# Patient Record
Sex: FEMALE | Race: BLACK OR AFRICAN AMERICAN | Employment: FULL TIME | URBAN - METROPOLITAN AREA
[De-identification: names, ages, dates, MRNs, and addresses within clinical notes are randomized per-mention and may not be internally consistent; named-entity substitution may affect disease eponyms.]

---

## 2018-06-12 LAB — RUBELLA ANTIBODY IGG: 251.1 IU/ML

## 2018-06-13 LAB
MUV IGG SER QL: 37.8 AU/ML
RUBEOLA (MEASLES) AB IGG: 169 AU/ML
VZV IGG SER QL IA: 138 IV

## 2019-01-14 ENCOUNTER — OFFICE VISIT (OUTPATIENT)
Dept: SURGERY | Age: 34
End: 2019-01-14
Payer: MEDICAID

## 2019-01-14 VITALS
HEIGHT: 66 IN | OXYGEN SATURATION: 97 % | TEMPERATURE: 97.9 F | DIASTOLIC BLOOD PRESSURE: 74 MMHG | RESPIRATION RATE: 12 BRPM | HEART RATE: 70 BPM | BODY MASS INDEX: 29.89 KG/M2 | WEIGHT: 186 LBS | SYSTOLIC BLOOD PRESSURE: 134 MMHG

## 2019-01-14 DIAGNOSIS — N64.81 BREAST PTOSIS: ICD-10-CM

## 2019-01-14 DIAGNOSIS — Z41.1 ENCOUNTER FOR COSMETIC SURGERY: ICD-10-CM

## 2019-01-14 DIAGNOSIS — M79.3 PANNICULITIS: Primary | ICD-10-CM

## 2019-01-14 PROCEDURE — G8417 CALC BMI ABV UP PARAM F/U: HCPCS | Performed by: SURGERY

## 2019-01-14 PROCEDURE — 99204 OFFICE O/P NEW MOD 45 MIN: CPT | Performed by: SURGERY

## 2019-01-14 PROCEDURE — G8427 DOCREV CUR MEDS BY ELIG CLIN: HCPCS | Performed by: SURGERY

## 2019-01-14 PROCEDURE — G8484 FLU IMMUNIZE NO ADMIN: HCPCS | Performed by: SURGERY

## 2019-01-14 PROCEDURE — 1036F TOBACCO NON-USER: CPT | Performed by: SURGERY

## 2019-01-14 RX ORDER — PSEUDOEPHEDRINE HCL 30 MG
500 TABLET ORAL
COMMUNITY
Start: 2018-10-30

## 2019-03-19 ENCOUNTER — TELEPHONE (OUTPATIENT)
Dept: SURGERY | Age: 34
End: 2019-03-19

## 2019-11-07 ENCOUNTER — APPOINTMENT (OUTPATIENT)
Dept: GENERAL RADIOLOGY | Age: 34
End: 2019-11-07
Payer: MEDICAID

## 2019-11-07 ENCOUNTER — HOSPITAL ENCOUNTER (EMERGENCY)
Age: 34
Discharge: HOME | End: 2019-11-07
Payer: MEDICAID

## 2019-11-07 VITALS
BODY MASS INDEX: 31.19 KG/M2 | WEIGHT: 190.31 LBS | HEART RATE: 86 BPM | RESPIRATION RATE: 16 BRPM | TEMPERATURE: 97.9 F | OXYGEN SATURATION: 100 % | DIASTOLIC BLOOD PRESSURE: 84 MMHG | SYSTOLIC BLOOD PRESSURE: 133 MMHG

## 2019-11-07 DIAGNOSIS — S16.1XXA CERVICAL STRAIN, ACUTE, INITIAL ENCOUNTER: Primary | ICD-10-CM

## 2019-11-07 DIAGNOSIS — M54.6 ACUTE MIDLINE THORACIC BACK PAIN: ICD-10-CM

## 2019-11-07 PROCEDURE — 72040 X-RAY EXAM NECK SPINE 2-3 VW: CPT

## 2019-11-07 PROCEDURE — 72070 X-RAY EXAM THORAC SPINE 2VWS: CPT

## 2019-11-07 PROCEDURE — 99283 EMERGENCY DEPT VISIT LOW MDM: CPT

## 2019-11-07 PROCEDURE — 6370000000 HC RX 637 (ALT 250 FOR IP): Performed by: PHYSICIAN ASSISTANT

## 2019-11-07 RX ORDER — LIDOCAINE 50 MG/G
1 PATCH TOPICAL DAILY
Qty: 30 PATCH | Refills: 0 | Status: SHIPPED | OUTPATIENT
Start: 2019-11-07

## 2019-11-07 RX ORDER — METHOCARBAMOL 750 MG/1
750 TABLET, FILM COATED ORAL EVERY 8 HOURS PRN
Qty: 30 TABLET | Refills: 0 | Status: SHIPPED | OUTPATIENT
Start: 2019-11-07 | End: 2019-11-17

## 2019-11-07 RX ORDER — FERROUS SULFATE 325(65) MG
325 TABLET ORAL
COMMUNITY
End: 2021-11-09 | Stop reason: SDUPTHER

## 2019-11-07 RX ORDER — LIDOCAINE 4 G/G
1 PATCH TOPICAL DAILY
Status: DISCONTINUED | OUTPATIENT
Start: 2019-11-07 | End: 2019-11-07 | Stop reason: HOSPADM

## 2019-11-07 RX ORDER — THIAMINE MONONITRATE (VIT B1) 100 MG
100 TABLET ORAL DAILY
COMMUNITY

## 2019-11-07 RX ORDER — IBUPROFEN 200 MG
400 TABLET ORAL ONCE
Status: COMPLETED | OUTPATIENT
Start: 2019-11-07 | End: 2019-11-07

## 2019-11-07 RX ORDER — NAPROXEN 500 MG/1
500 TABLET ORAL 2 TIMES DAILY PRN
Qty: 20 TABLET | Refills: 0 | Status: SHIPPED | OUTPATIENT
Start: 2019-11-07 | End: 2021-11-09

## 2019-11-07 RX ADMIN — IBUPROFEN 400 MG: 200 TABLET, FILM COATED ORAL at 13:54

## 2019-11-07 ASSESSMENT — ENCOUNTER SYMPTOMS
DIARRHEA: 0
SHORTNESS OF BREATH: 0
COLOR CHANGE: 0
ABDOMINAL PAIN: 0
NAUSEA: 0
VOMITING: 0
CHEST TIGHTNESS: 0

## 2019-11-07 ASSESSMENT — PAIN DESCRIPTION - PAIN TYPE: TYPE: ACUTE PAIN

## 2019-11-07 ASSESSMENT — PAIN DESCRIPTION - LOCATION: LOCATION: BACK

## 2019-11-07 ASSESSMENT — PAIN SCALES - GENERAL: PAINLEVEL_OUTOF10: 10

## 2020-07-19 ENCOUNTER — HOSPITAL ENCOUNTER (EMERGENCY)
Age: 35
Discharge: HOME | End: 2020-07-19
Payer: MEDICAID

## 2020-07-19 VITALS
TEMPERATURE: 97.5 F | HEART RATE: 76 BPM | BODY MASS INDEX: 33.68 KG/M2 | WEIGHT: 209.56 LBS | HEIGHT: 66 IN | DIASTOLIC BLOOD PRESSURE: 85 MMHG | SYSTOLIC BLOOD PRESSURE: 132 MMHG | RESPIRATION RATE: 16 BRPM

## 2020-07-19 PROCEDURE — 6370000000 HC RX 637 (ALT 250 FOR IP): Performed by: PHYSICIAN ASSISTANT

## 2020-07-19 PROCEDURE — 99282 EMERGENCY DEPT VISIT SF MDM: CPT

## 2020-07-19 RX ORDER — PREDNISONE 20 MG/1
TABLET ORAL
Qty: 18 TABLET | Refills: 0 | Status: SHIPPED | OUTPATIENT
Start: 2020-07-19 | End: 2020-07-29

## 2020-07-19 RX ORDER — FAMOTIDINE 20 MG/1
20 TABLET, FILM COATED ORAL 2 TIMES DAILY
Qty: 60 TABLET | Refills: 0 | Status: SHIPPED | OUTPATIENT
Start: 2020-07-19

## 2020-07-19 RX ORDER — PREDNISONE 20 MG/1
60 TABLET ORAL ONCE
Status: COMPLETED | OUTPATIENT
Start: 2020-07-19 | End: 2020-07-19

## 2020-07-19 RX ORDER — DIPHENHYDRAMINE HCL 25 MG
25 CAPSULE ORAL EVERY 4 HOURS PRN
Qty: 25 CAPSULE | Refills: 0 | Status: SHIPPED | OUTPATIENT
Start: 2020-07-19 | End: 2020-07-29

## 2020-07-19 RX ORDER — DIPHENHYDRAMINE HCL 25 MG
25 TABLET ORAL ONCE
Status: COMPLETED | OUTPATIENT
Start: 2020-07-19 | End: 2020-07-19

## 2020-07-19 RX ORDER — FAMOTIDINE 20 MG/1
20 TABLET, FILM COATED ORAL ONCE
Status: COMPLETED | OUTPATIENT
Start: 2020-07-19 | End: 2020-07-19

## 2020-07-19 RX ADMIN — PREDNISONE 60 MG: 20 TABLET ORAL at 09:45

## 2020-07-19 RX ADMIN — DIPHENHYDRAMINE HCL 25 MG: 25 TABLET ORAL at 09:45

## 2020-07-19 RX ADMIN — FAMOTIDINE 20 MG: 20 TABLET, FILM COATED ORAL at 09:44

## 2020-07-19 ASSESSMENT — ENCOUNTER SYMPTOMS
RESPIRATORY NEGATIVE: 1
DIARRHEA: 0
COUGH: 0
CONSTIPATION: 0
CHEST TIGHTNESS: 0
BACK PAIN: 0
COLOR CHANGE: 0
SHORTNESS OF BREATH: 0
NAUSEA: 0
VOMITING: 0
ABDOMINAL PAIN: 0

## 2020-07-19 NOTE — ED PROVIDER NOTES
[facility]        Pt Name: Cindy Carrizales  MRN: [id number]  [place] 1985  Date of evaluation: 7/19/2020  Provider: WAN Harvey  PCP: Grace Gonzalez    Evaluation by LUIS ALFREDO. My supervising physician was available for consultation. CHIEF COMPLAINT       Chief Complaint   Patient presents with    Pruritis     Pt. comes in today with complaints of itching. Pt. reports that it started around 6am this morning. Pt. reports that she is also breaking out in hives. Pt. denies any new exposures. HISTORY OF PRESENT ILLNESS   (Location, Timing/Onset, Context/Setting, Quality, Duration, Modifying Factors, Severity, Associated Signs and Symptoms)  Note limiting factors. Cindy Carrizales is a 30 y.o. female with no significant past medical history who presents to the ED with complaint of itching. Came in for shift today and states she started having diffuse itching. States she started breaking out in hives. Patient states hives diffusely throughout her skin. States hives the arms, legs, face, neck, abdomen and back. Patient denies any new contacts including soaps, detergents, medicines, foods, cosmetics, lotions or clothing. Denies any itching and family members at home. Denies history of similar symptoms in the past.  Denies taking any over-the-counter medication for symptom control. Became concerned and came to the ED for further evaluation treatment. Nursing Notes were all reviewed and agreed with or any disagreements were addressed in the HPI. REVIEW OF SYSTEMS    (2-9 systems for level 4, 10 or more for level 5)     Review of Systems   Constitutional: Negative for activity change, appetite change, chills, diaphoresis, fatigue and fever. Respiratory: Negative. Negative for cough, chest tightness and shortness of breath. Cardiovascular: Negative. Negative for chest pain, palpitations and leg swelling. Gastrointestinal: Negative for abdominal pain, constipation, diarrhea, nausea and vomiting. Genitourinary: Negative for difficulty urinating and dysuria. Musculoskeletal: Negative for arthralgias, back pain, myalgias, neck pain and neck stiffness. Skin: Positive for rash. Negative for color change, pallor and wound. Neurological: Negative for dizziness, weakness, light-headedness, numbness and headaches. Positives and Pertinent negatives as per HPI. Except as noted above in the ROS, all other systems were reviewed and negative. PAST MEDICAL HISTORY     Past Medical History:   Diagnosis Date    Arthritis          SURGICAL HISTORY     Past Surgical History:   Procedure Laterality Date    BARIATRIC SURGERY       SECTION           CURRENTMEDICATIONS       Previous Medications    CALCIUM CITRATE (CALCITRATE) 250 MG TABS TABLET    Take 500 mg by mouth    CALCIUM CITRATE-VITAMIN D (CITRACAL PETITES/VITAMIN D) 200-250 MG-UNIT TABS    Take 1 tablet by mouth    ELASTIC BANDAGES & SUPPORTS (ABDOMINAL BINDER/ELASTIC XL) MISC    Dispense one extra large binder . FERROUS SULFATE 325 (65 FE) MG TABLET    Take 325 mg by mouth daily (with breakfast)    LIDOCAINE (LIDODERM) 5 %    Place 1 patch onto the skin daily 12 hours on, 12 hours off. NAPROXEN (NAPROSYN) 500 MG TABLET    Take 1 tablet by mouth 2 times daily as needed for Pain    ONDANSETRON (ZOFRAN ODT) 4 MG DISINTEGRATING TABLET    Take 1-2 tablets by mouth every 12 hours as needed for Nausea. VITAMIN B-1 (THIAMINE) 100 MG TABLET    Take 100 mg by mouth daily         ALLERGIES     Patient has no known allergies. FAMILYHISTORY     History reviewed. No pertinent family history.        SOCIAL HISTORY       Social History     Tobacco Use    Smoking status: Never Smoker    Smokeless tobacco: Never Used   Substance Use Topics    Alcohol use: No    Drug use: Not Currently       SCREENINGS             PHYSICAL EXAM    (up to 7 for level 4, 8 or more for level 5)     ED Triage Vitals [07/19/20 0930]   BP Temp Temp Source Pulse Resp SpO2 Height Weight   132/85 97.5 °F (36.4 °C) Oral 76 16 -- 5' 5.5\" (1.664 m) 209 lb 9 oz (95.1 kg)       Physical Exam  Constitutional:       General: She is not in acute distress. Appearance: Normal appearance. She is well-developed. She is not ill-appearing, toxic-appearing or diaphoretic. HENT:      Head: Normocephalic and atraumatic. Right Ear: External ear normal.      Left Ear: External ear normal.      Mouth/Throat:      Mouth: Mucous membranes are moist.      Pharynx: No oropharyngeal exudate or posterior oropharyngeal erythema. Comments: No drooling, trismus, stridor or respiratory stress noted. No change in phonation. Eyes:      General:         Right eye: No discharge. Left eye: No discharge. Conjunctiva/sclera: Conjunctivae normal.   Neck:      Musculoskeletal: Normal range of motion and neck supple. Cardiovascular:      Rate and Rhythm: Normal rate and regular rhythm. Pulses: Normal pulses. Heart sounds: Normal heart sounds. No murmur. No friction rub. No gallop. Pulmonary:      Effort: Pulmonary effort is normal. No respiratory distress. Breath sounds: Normal breath sounds. No stridor. No wheezing, rhonchi or rales. Chest:      Chest wall: No tenderness. Musculoskeletal: Normal range of motion. Skin:     General: Skin is warm and dry. Coloration: Skin is not pale. Findings: Rash present. No erythema. Comments: Urticarial rash noted to the arms, legs, abdomen, back, neck and face. No oral mucosal involvement noted. Neurological:      Mental Status: She is alert and oriented to person, place, and time. Psychiatric:         Behavior: Behavior normal.         DIAGNOSTIC RESULTS   LABS:    Labs Reviewed - No data to display    All other labs were within normal range or not returned as of this dictation. EKG:  All EKG's are interpreted by the Emergency Department Physician in the absence of a cardiologist.  Please see their note for interpretation of EKG. RADIOLOGY:   Non-plain film images such as CT, Ultrasound and MRI are read by the radiologist. Plain radiographic images are visualized and preliminarily interpreted by the ED Provider with the below findings:        Interpretation per the Radiologist below, if available at the time of this note:    No orders to display     No results found. PROCEDURES   Unless otherwise noted below, none     Procedures    CRITICAL CARE TIME   N/A    CONSULTS:  None      EMERGENCY DEPARTMENT COURSE and DIFFERENTIAL DIAGNOSIS/MDM:   Vitals:    Vitals:    07/19/20 0930   BP: 132/85   Pulse: 76   Resp: 16   Temp: 97.5 °F (36.4 °C)   TempSrc: Oral   Weight: 209 lb 9 oz (95.1 kg)   Height: 5' 5.5\" (1.664 m)       Patient was given the following medications:  Medications   predniSONE (DELTASONE) tablet 60 mg (60 mg Oral Given 7/19/20 0945)   diphenhydrAMINE (BENADRYL) tablet 25 mg (25 mg Oral Given 7/19/20 0945)   famotidine (PEPCID) tablet 20 mg (20 mg Oral Given 7/19/20 0944)           Patient is a 60-year-old female who presents the input of a rash. Has hives with associated itching concerning for urticarial rash. No new contacts. No oral mucosal involvement. No respiratory distress. Given prednisone, Benadryl and Pepcid here in the ED. Upon repeat examination patient still has hives with appears to be improving. Will discharge home with similar. Follow-up with PCP. Return if any worsening symptoms. Suffering from what appears to be urticarial rash. Low suspicion for angioedema, anaphylaxis, SJS, HSP, TEN, scabies, bedbugs, varicella, milia, tinea, erysipelas, scalded skin syndrome, meningococcemia, occult bacteremia, necrotizing fasciitis, folliculitis, cellulitis, abscess or other emergent etiology at this time. FINAL IMPRESSION      1.  Urticarial rash          DISPOSITION/PLAN DISPOSITION        PATIENT REFERREDTO:  Blake Part  [address] [facility] [address] [phone]    Schedule an appointment as soon as possible for a visit   As needed, If symptoms worsen    [facility] Emergency Department  [address] [facility]  [phone]  Go to   If symptoms worsen, As needed      DISCHARGE MEDICATIONS:  New Prescriptions    DIPHENHYDRAMINE (BENADRYL) 25 MG CAPSULE    Take 1 capsule by mouth every 4 hours as needed for Itching    FAMOTIDINE (PEPCID) 20 MG TABLET    Take 1 tablet by mouth 2 times daily    PREDNISONE (DELTASONE) 20 MG TABLET    3 tabs po qam for 3 days then 2 tabs qam for 3 days the 1 tab qam for 3 days       DISCONTINUED MEDICATIONS:  Discontinued Medications    No medications on file              (Please note that portions of this note were completed with a voice recognition program.  Efforts were made to edit the dictations but occasionally words are mis-transcribed.)    WAN Lopez (electronically signed)          WAN Carlson  07/19/20 7331

## 2021-06-16 ENCOUNTER — IMMUNIZATION (OUTPATIENT)
Dept: PRIMARY CARE CLINIC | Age: 36
End: 2021-06-16
Payer: MEDICAID

## 2021-06-16 PROCEDURE — 0001A COVID-19, PFIZER VACCINE 30MCG/0.3ML DOSE: CPT | Performed by: FAMILY MEDICINE

## 2021-06-16 PROCEDURE — 91300 COVID-19, PFIZER VACCINE 30MCG/0.3ML DOSE: CPT | Performed by: FAMILY MEDICINE

## 2021-07-14 ENCOUNTER — IMMUNIZATION (OUTPATIENT)
Dept: PRIMARY CARE CLINIC | Age: 36
End: 2021-07-14
Payer: MEDICAID

## 2021-07-14 PROCEDURE — 0002A COVID-19, PFIZER VACCINE 30MCG/0.3ML DOSE: CPT | Performed by: FAMILY MEDICINE

## 2021-07-14 PROCEDURE — 91300 COVID-19, PFIZER VACCINE 30MCG/0.3ML DOSE: CPT | Performed by: FAMILY MEDICINE

## 2021-08-04 ENCOUNTER — OFFICE VISIT (OUTPATIENT)
Dept: SURGERY | Age: 36
End: 2021-08-04
Payer: MEDICAID

## 2021-08-04 VITALS
HEART RATE: 98 BPM | SYSTOLIC BLOOD PRESSURE: 112 MMHG | WEIGHT: 227 LBS | BODY MASS INDEX: 37.82 KG/M2 | DIASTOLIC BLOOD PRESSURE: 66 MMHG | HEIGHT: 65 IN

## 2021-08-04 DIAGNOSIS — M62.08 RECTUS DIASTASIS: ICD-10-CM

## 2021-08-04 DIAGNOSIS — N64.81 BREAST PTOSIS: ICD-10-CM

## 2021-08-04 DIAGNOSIS — M79.3 PANNICULITIS: Primary | ICD-10-CM

## 2021-08-04 PROCEDURE — G8417 CALC BMI ABV UP PARAM F/U: HCPCS | Performed by: SURGERY

## 2021-08-04 PROCEDURE — G8427 DOCREV CUR MEDS BY ELIG CLIN: HCPCS | Performed by: SURGERY

## 2021-08-04 PROCEDURE — 1036F TOBACCO NON-USER: CPT | Performed by: SURGERY

## 2021-08-04 PROCEDURE — 99213 OFFICE O/P EST LOW 20 MIN: CPT | Performed by: SURGERY

## 2021-08-04 NOTE — PROGRESS NOTES
[address] [facility]    Consultation requested by: Self Melody Perry)    CC: Body contouring    HPI: 29 y.o. female with a PMHx as delineated below who presents in consultation for body contouring. She underwent a sleeve with Dr. Dandy Nowak at [address] [place] [address] (). She has lost 150 lbs since her surgery. She is actively working to decrease her weight another 40 lbs. She has been having difficulties with rashes beneath her lowest abdominal fold and has been on prescription powder without improvement. She additionally notes that she has decreased volume of her breasts and also has excess skin in her arms as well as decreased volume in her buttocks.       Bariatric surgery: Yes / date:  (Type: sleeve gastrectomy)    Max weight (lbs): 400  Lowest weight (lbs): 186  Current (lbs): 227 (186)  Weight change in the past 3 months: Yes (gained weight)  Max BMI: 56  Current BMI: 37.7 (30.5)  History of DVT/PE: No  Excess skin cover genitals: Yes    Last Mammogram: None    Current bra size: 32B  Desired bra size: Smaller with lift  Pregnancies/miscarriages: 4 / 0  Breast feeding: no future plans    Previous body contouring procedures: No  Smoking: No      Past Medical History:   Diagnosis Date    Arthritis      Past Surgical History:   Procedure Laterality Date    BARIATRIC SURGERY       SECTION       Social History     Socioeconomic History    Marital status: Single     Spouse name: Not on file    Number of children: Not on file    Years of education: Not on file    Highest education level: Not on file   Occupational History    Not on file   Tobacco Use    Smoking status: Never Smoker    Smokeless tobacco: Never Used   Substance and Sexual Activity    Alcohol use: No    Drug use: Not Currently    Sexual activity: Not on file   Other Topics Concern    Not on file   Social History Narrative    Not on file     Social Determinants of Health     Financial Resource Strain:    [facility] Difficulty of Paying Living Expenses:    Food Insecurity:     Worried About Running Out of Food in the Last Year:     [address] in the Last Year:    Transportation Needs:     Lack of Transportation (Medical):  Lack of Transportation (Non-Medical):    Physical Activity:     Days of Exercise per Week:     Minutes of Exercise per Session:    Stress:     Feeling of Stress :    Social Connections:     Frequency of Communication with Friends and Family:     Frequency of Social Gatherings with Friends and Family:     Attends [employer] Services:     Active Member of Clubs or Organizations:     Attends Club or Organization Meetings:     Marital Status:    Intimate Partner Violence:     Fear of Current or Ex-Partner:     Emotionally Abused:     Physically Abused:     Sexually Abused:      History reviewed. No pertinent family history. Allergy: No Known Allergies    ROS   Constitutional: Negative for chills and fever. HENT: Negative for congestion, facial swelling, and voice change. Eyes: Negative for photophobia and visual disturbance. Respiratory: Negative for apnea, cough, chest tightness and shortness of breath. Cardiovascular: Negative for chest pain and palpitations. Gastrointestinal: Negative for dysphagia and early satiety. Genitourinary: Negative for difficulty urinating, dysuria, flank pain, frequency and hematuria. Musculoskeletal: Negative for new gait problem, joint swelling and myalgias. Skin: Negative for color change, pallor and rash. Endocrine: negative for tremors, temperature intolerance or polydipsia. Allergic/Immunologic: Negative for new environmental or food allergies. Neurological: Negative for dizziness, seizures, speech difficulty, numbness. Hematological: Negative for adenopathy. Psychiatric/Behavioral: Negative for agitation and confusion.     EXAM  /66 (Site: Left Upper Arm, Position: Sitting, Cuff Size: Large Adult)   Pulse 98   Ht 5' 5\" (1.651 m)   Wt 227 lb (103 kg)   LMP 07/21/2021   BMI 37.77 kg/m²     GEN: NAD, pleasant, healthy  CVS: RRR  PULM: No respiratory distress  HEENT: PERRLA/EOMI; dentition good, hearing appears within normal limits  NECK: Supple with trachea in midline, no masses  BREAST: Left larger than Right   R  Ptosis ndgndrndanddndend:nd nd2nd Palpable masses: No     Nipple retraction: No     Palpable axillary lymphadenopathy: No     SN-N: 28.5 cm     N-IMF: 9.5 cm     Breast width: 14 cm           L  Ptosis ndgndrndanddndend:nd nd2nd Palpable masses: No     Nipple retraction: No     Palpable axillary lymphadenopathy: No     SN-N: 30 cm     N-IMF: 9.4 cm     Breast width: 14 cm     Medialized nipple position  ABD: soft/NT/ND  No palpable hernia, but exam limited secondary to habitus  Excess skin in both horizontal and vertical dimensions with skin extending beyond Mons    EXT: No lymphedema noted   Excess skin upper extremities (2B)  BACK/BUTTOCK: Excess skin above the buttock with a deflated appearance of her buttocks bilaterally   NEURO: No focal deficits, no obvious CN deficits    IMP: 29 y.o. female with panniculitis, breast ptosis after MWL  PLAN: Would benefit from panniculectomy with abdominoplasty and rectus plication with concomitant mastopexy to improve her breast shape and volume without an implant. Once she has recovered from this, we can stage her buttock auto augmentation with lower back tissue and subsequently plan for her brachioplasties. She needs to work to lose weight and once she obtains weight stability, will submit to insurance for her panniculectomy. Will see her again in 6 months. The complexity of body contouring procedures and wound healing physiology were discussed with the patient. She was counseled on ideal medical optimization (nutrition, weight stability, etc.). We also discussed the pros & cons of staging for multiple procedures including controlling tension from various sites and postoperative care managment.  The risks, benefits, alternatives, outcomes, personnel involved were discussed and all questions were answered in a satisfactory manner according to the patient. Specifically,the risks including, but not limited to: bleeding possibly requiring transfusion orreoperation, infection, seroma, nonhealing of wounds, poor cosmetic outcome, scarring, VTE (DVT/PE), and death were discussed.        Bonnie Schwarz MD  [facility]  08/04/21

## 2021-11-08 PROCEDURE — 99283 EMERGENCY DEPT VISIT LOW MDM: CPT

## 2021-11-09 ENCOUNTER — APPOINTMENT (OUTPATIENT)
Dept: GENERAL RADIOLOGY | Age: 36
End: 2021-11-09
Payer: MEDICAID

## 2021-11-09 ENCOUNTER — HOSPITAL ENCOUNTER (EMERGENCY)
Age: 36
Discharge: HOME | End: 2021-11-09
Attending: EMERGENCY MEDICINE
Payer: MEDICAID

## 2021-11-09 VITALS
DIASTOLIC BLOOD PRESSURE: 101 MMHG | HEIGHT: 65 IN | TEMPERATURE: 98 F | OXYGEN SATURATION: 100 % | HEART RATE: 71 BPM | BODY MASS INDEX: 34.99 KG/M2 | RESPIRATION RATE: 16 BRPM | SYSTOLIC BLOOD PRESSURE: 143 MMHG | WEIGHT: 210 LBS

## 2021-11-09 DIAGNOSIS — Z91.89 ACUTE NONSPECIFIC CHEST PAIN WITH LOW RISK OF CORONARY ARTERY DISEASE: Primary | ICD-10-CM

## 2021-11-09 DIAGNOSIS — D50.9 MICROCYTIC ANEMIA: ICD-10-CM

## 2021-11-09 DIAGNOSIS — R07.9 ACUTE NONSPECIFIC CHEST PAIN WITH LOW RISK OF CORONARY ARTERY DISEASE: Primary | ICD-10-CM

## 2021-11-09 LAB
ANION GAP SERPL CALCULATED.3IONS-SCNC: 8 MMOL/L (ref 3–16)
ANISOCYTOSIS: ABNORMAL
BASOPHILS ABSOLUTE: 0 K/UL (ref 0–0.2)
BASOPHILS RELATIVE PERCENT: 0.6 %
BUN BLDV-MCNC: 13 MG/DL (ref 7–20)
CALCIUM SERPL-MCNC: 9.1 MG/DL (ref 8.3–10.6)
CHLORIDE BLD-SCNC: 107 MMOL/L (ref 99–110)
CO2: 23 MMOL/L (ref 21–32)
CREAT SERPL-MCNC: 0.6 MG/DL (ref 0.6–1.1)
EKG ATRIAL RATE: 77 BPM
EKG DIAGNOSIS: NORMAL
EKG P AXIS: 67 DEGREES
EKG P-R INTERVAL: 168 MS
EKG Q-T INTERVAL: 392 MS
EKG QRS DURATION: 86 MS
EKG QTC CALCULATION (BAZETT): 443 MS
EKG R AXIS: 21 DEGREES
EKG T AXIS: 20 DEGREES
EKG VENTRICULAR RATE: 77 BPM
EOSINOPHILS ABSOLUTE: 0.1 K/UL (ref 0–0.6)
EOSINOPHILS RELATIVE PERCENT: 2.7 %
GFR AFRICAN AMERICAN: >60
GFR NON-AFRICAN AMERICAN: >60
GLUCOSE BLD-MCNC: 89 MG/DL (ref 70–99)
HCT VFR BLD CALC: 24.5 % (ref 36–48)
HEMATOLOGY PATH CONSULT: NO
HEMOGLOBIN: 7.1 G/DL (ref 12–16)
HYPERCHROMASIA: ABNORMAL
LYMPHOCYTES ABSOLUTE: 1.5 K/UL (ref 1–5.1)
LYMPHOCYTES RELATIVE PERCENT: 31.9 %
MCH RBC QN AUTO: 17.9 PG (ref 26–34)
MCHC RBC AUTO-ENTMCNC: 21.9 G/DL (ref 31–36)
MCV RBC AUTO: 61.6 FL (ref 80–100)
MICROCYTES: ABNORMAL
MONOCYTES ABSOLUTE: 0.5 K/UL (ref 0–1.3)
MONOCYTES RELATIVE PERCENT: 10.9 %
NEUTROPHILS ABSOLUTE: 2.5 K/UL (ref 1.7–7.7)
NEUTROPHILS RELATIVE PERCENT: 53.9 %
OVALOCYTES: ABNORMAL
PDW BLD-RTO: 21 % (ref 12.4–15.4)
PLATELET # BLD: 512 K/UL (ref 135–450)
PLATELET SLIDE REVIEW: ABNORMAL
PMV BLD AUTO: 8.2 FL (ref 5–10.5)
POTASSIUM REFLEX MAGNESIUM: 3.7 MMOL/L (ref 3.5–5.1)
RBC # BLD: 3.98 M/UL (ref 4–5.2)
SLIDE REVIEW: ABNORMAL
SODIUM BLD-SCNC: 138 MMOL/L (ref 136–145)
TROPONIN: <0.01 NG/ML
WBC # BLD: 4.6 K/UL (ref 4–11)

## 2021-11-09 PROCEDURE — 85025 COMPLETE CBC W/AUTO DIFF WBC: CPT

## 2021-11-09 PROCEDURE — 93010 ELECTROCARDIOGRAM REPORT: CPT | Performed by: INTERNAL MEDICINE

## 2021-11-09 PROCEDURE — 6370000000 HC RX 637 (ALT 250 FOR IP): Performed by: EMERGENCY MEDICINE

## 2021-11-09 PROCEDURE — 93005 ELECTROCARDIOGRAM TRACING: CPT | Performed by: EMERGENCY MEDICINE

## 2021-11-09 PROCEDURE — 80048 BASIC METABOLIC PNL TOTAL CA: CPT

## 2021-11-09 PROCEDURE — 84484 ASSAY OF TROPONIN QUANT: CPT

## 2021-11-09 PROCEDURE — 71046 X-RAY EXAM CHEST 2 VIEWS: CPT

## 2021-11-09 RX ORDER — NAPROXEN 500 MG/1
500 TABLET ORAL 2 TIMES DAILY WITH MEALS
Qty: 20 TABLET | Refills: 0 | Status: SHIPPED | OUTPATIENT
Start: 2021-11-09 | End: 2021-11-19

## 2021-11-09 RX ORDER — METHOCARBAMOL 750 MG/1
750-1500 TABLET, FILM COATED ORAL EVERY 8 HOURS PRN
Qty: 30 TABLET | Refills: 0 | Status: SHIPPED | OUTPATIENT
Start: 2021-11-09 | End: 2021-11-19

## 2021-11-09 RX ORDER — ACETAMINOPHEN 325 MG/1
650 TABLET ORAL ONCE
Status: COMPLETED | OUTPATIENT
Start: 2021-11-09 | End: 2021-11-09

## 2021-11-09 RX ORDER — FERROUS SULFATE 325(65) MG
325 TABLET ORAL
Qty: 30 TABLET | Refills: 0 | Status: SHIPPED | OUTPATIENT
Start: 2021-11-09

## 2021-11-09 RX ORDER — ASPIRIN 81 MG/1
324 TABLET, CHEWABLE ORAL ONCE
Status: COMPLETED | OUTPATIENT
Start: 2021-11-09 | End: 2021-11-09

## 2021-11-09 RX ADMIN — ACETAMINOPHEN 650 MG: 325 TABLET ORAL at 01:36

## 2021-11-09 RX ADMIN — ASPIRIN 81 MG 324 MG: 81 TABLET ORAL at 01:36

## 2021-11-09 ASSESSMENT — HEART SCORE: ECG: 1

## 2021-11-09 ASSESSMENT — PAIN SCALES - GENERAL
PAINLEVEL_OUTOF10: 5
PAINLEVEL_OUTOF10: 5

## 2021-11-09 NOTE — ED PROVIDER NOTES
EMERGENCY DEPARTMENT PROVIDER NOTE    Patient Identification  Pt Name: Cristi Bunch  MRN: [id number]  [place] 1985  Date of evaluation: 11/8/2021  Provider: Winston Basilio DO  PCP: Brissa Luciano    Chief Complaint  Chest Pain (5/10 chest pain since Saturday 11/6 that hurts worse with movement.  )      HPI  (History provided by patient)  This is a 29 y.o. female with pertinent past medical history of iron deficiency anemia, arthritis who was brought in by self for left-sided chest pain ongoing for the past 3 days. Patient states pain began after she was bending over to put air attire. Pain is sharp and stabbing, starts in the center of her chest and radiates out to the left side. Worsened with movement and taking deep breaths. Nothing seems to make it acutely better, however symptoms have been gradually improving since onset. She denies any fevers, chills, shortness of breath or cough. No history of VTE. Denies history of hypertension, high cholesterol, diabetes or tobacco use. No OCP use. ROS    Const:  No fevers, no chills, no generalized weakness  Skin:  No rash, no lesions  Eyes:  No visual changes, no blurry or double vision, no pain  ENT:  No sore throat, no difficulty swallowing, no ear pain, no sinus pain or congestion  Card:  +chest pain, no palpitations, no edema  Resp:  No shortness of breath, no cough, no wheezing  Abd:  No abdominal pain, no nausea, no vomiting, no diarrhea  Genitourinary:  No dysuria, no hematuria, no vaginal discharge, no vaginal bleeding  MSK:  No joint pain, no myalgia  Neuro:  No focal weakness, no headache, no paresthesia    All other systems reviewed and negative unless otherwise noted in HPI      I have reviewed the following nursing documentation:  Allergies: Patient has no known allergies.     Past medical history:   Past Medical History:   Diagnosis Date    Arthritis      Past surgical history:   Past Surgical History:   Procedure Laterality Date    BARIATRIC SURGERY       SECTION         Home medications:   Discharge Medication List as of 2021  2:54 AM      CONTINUE these medications which have NOT CHANGED    Details   famotidine (PEPCID) 20 MG tablet Take 1 tablet by mouth 2 times daily, Disp-60 tablet,R-0Print      vitamin B-1 (THIAMINE) 100 MG tablet Take 100 mg by mouth dailyHistorical Med      lidocaine (LIDODERM) 5 % Place 1 patch onto the skin daily 12 hours on, 12 hours off., Disp-30 patch, R-0Print      calcium citrate (CALCITRATE) 250 MG TABS tablet Take 500 mg by mouthHistorical Med      Calcium Citrate-Vitamin D (CITRACAL PETITES/VITAMIN D) 200-250 MG-UNIT TABS Take 1 tablet by mouthHistorical Med      Elastic Bandages & Supports (ABDOMINAL BINDER/ELASTIC XL) MISC Historical Med      ondansetron (ZOFRAN ODT) 4 MG disintegrating tablet Take 1-2 tablets by mouth every 12 hours as needed for Nausea., Disp-12 tablet, R-0             Social history:  reports that she has never smoked. She has never used smokeless tobacco. She reports previous drug use. She reports that she does not drink alcohol. Family history:  History reviewed. No pertinent family history. Exam  ED Triage Vitals [21 0046]   BP Temp Temp Source Pulse Resp SpO2 Height Weight   (!) 151/103 98 °F (36.7 °C) Oral 71 16 100 % 5' 5\" (1.651 m) 210 lb (95.3 kg)     Nursing note and vitals reviewed. Constitutional: Well developed, well nourished. Non-toxic in appearance. BMI 34.95  HENT:      Head: Normocephalic and atraumatic. Ears: External ears normal.      Nose: Nose normal.     Mouth: Membrane mucosa moist and pink. Eyes: Anicteric sclera. No discharge. Neck: Supple. Trachea midline. Cardiovascular: RRR; no murmurs, rubs, or gallops. Radial 2+ and symmetric. Pulmonary/Chest: Effort normal. No respiratory distress. CTAB. No stridor. No wheezes. No rales. Abdominal: Soft. No distension. Nontender to deep palpation all quadrants.   Musculoskeletal: Moves all extremities. No gross deformity. Neurological: Alert and oriented. Face symmetric. Speech is clear. 5 out of 5 motor and sensation grossly intact bilateral upper extremities. Skin: Warm and dry. No rash. Psychiatric: Normal mood and affect. Behavior is normal.    Procedures      EKG    EKG was reviewed by emergency department physician in the absence of a cardiologist    Narrow complex sinus rhythm, rate 77, normal axis, normal NV and QRS intervals, normal Qtc, no ST elevations or depressions, TWI V3, impression NSR with nonspecific T wave morphology, no STEMI, no comparison available      Radiology  XR CHEST (2 VW)   Final Result   No acute disease.              Labs  Results for orders placed or performed during the hospital encounter of 11/09/21   CBC Auto Differential   Result Value Ref Range    WBC 4.6 4.0 - 11.0 K/uL    RBC 3.98 (L) 4.00 - 5.20 M/uL    Hemoglobin 7.1 (L) 12.0 - 16.0 g/dL    Hematocrit 24.5 (L) 36.0 - 48.0 %    MCV 61.6 (L) 80.0 - 100.0 fL    MCH 17.9 (L) 26.0 - 34.0 pg    MCHC 21.9 (L) 31.0 - 36.0 g/dL    RDW 21.0 (H) 12.4 - 15.4 %    Platelets 835 (H) 573 - 450 K/uL    MPV 8.2 5.0 - 10.5 fL    PLATELET SLIDE REVIEW Increased     SLIDE REVIEW see below     Path Consult No     Neutrophils % 53.9 %    Lymphocytes % 31.9 %    Monocytes % 10.9 %    Eosinophils % 2.7 %    Basophils % 0.6 %    Neutrophils Absolute 2.5 1.7 - 7.7 K/uL    Lymphocytes Absolute 1.5 1.0 - 5.1 K/uL    Monocytes Absolute 0.5 0.0 - 1.3 K/uL    Eosinophils Absolute 0.1 0.0 - 0.6 K/uL    Basophils Absolute 0.0 0.0 - 0.2 K/uL    Anisocytosis 1+ (A)     Microcytes 1+ (A)     Hyperchromasia Occasional (A)     Ovalocytes Occasional (A)    Basic Metabolic Panel w/ Reflex to MG   Result Value Ref Range    Sodium 138 136 - 145 mmol/L    Potassium reflex Magnesium 3.7 3.5 - 5.1 mmol/L    Chloride 107 99 - 110 mmol/L    CO2 23 21 - 32 mmol/L    Anion Gap 8 3 - 16    Glucose 89 70 - 99 mg/dL    BUN 13 7 - 20 mg/dL CREATININE 0.6 0.6 - 1.1 mg/dL    GFR Non-African American >60 >60    GFR African American >60 >60    Calcium 9.1 8.3 - 10.6 mg/dL   Troponin   Result Value Ref Range    Troponin <0.01 <0.01 ng/mL   EKG 12 Lead   Result Value Ref Range    Ventricular Rate 77 BPM    Atrial Rate 77 BPM    P-R Interval 168 ms    QRS Duration 86 ms    Q-T Interval 392 ms    QTc Calculation (Bazett) 443 ms    P Axis 67 degrees    R Axis 21 degrees    T Axis 20 degrees    Diagnosis       Normal sinus rhythmNonspecific T wave abnormalityAbnormal ECGConfirmed by [facility] MD, [id number] ([id number]) on 11/9/2021 8:58:32 AM       Screenings     Heart Score for chest pain patients  History: Slightly Suspicious  ECG: Non-Specifc repolarization disturbance/LBTB/PM  Patient Age: < 45 years  *Risk factors for Atherosclerotic disease: Obesity  Risk Factors: 1 or 2 risk factors  Troponin: < 1X normal limit  Heart Score Total: 2     [facility] and ED Course      Causes of chest pain considered including but not limited to: ACS, pulmonary embolism, aortic dissection, pneumothorax, pneumonia, costochondritis/musculoskeletal, gastroesophageal reflux    Patient nontoxic and in no distress. EKG no STEMI, a troponin is normal. Patient is low risk by HEART score and ACS not supported. No tachycardia or respiratory symptoms, PERC negative and low risk for pulmonary embolism. CXR with no acute findings, no consolidation or pneumothorax. No findings to suggest aortic dissection. Laboratory work-up is reassuring without evidence of endorgan dysfunction or clinically significant electrolyte derangement. Microcytic anemia with hemoglobin 7.1 noted, on record review patient has had steadily decreasing hemoglobin over the past few years. She denies any active bleeding, her anemia is currently asymptomatic. Patient states has not been adherent to her iron supplementation. Discussed importance of restarting her iron and she verbalized understanding.   Patient's pain well-controlled and felt safe for discharge to self-care with close outpatient follow up. Recommend recheck of blood counts within the next few days. Patient is agreeable with plan to discharge and all questions were answered. Strict return precautions including worsening/changing chest pain, vomiting, shortness of breath, fevers, palpitations or syncope were discussed. Final Impression  1. Acute nonspecific chest pain with low risk of coronary artery disease    2. Microcytic anemia        Blood pressure (!) 143/101, pulse 71, temperature 98 °F (36.7 °C), temperature source Oral, resp. rate 16, height 5' 5\" (1.651 m), weight 210 lb (95.3 kg), last menstrual period 11/01/2021, SpO2 100 %. Disposition:  DISPOSITION Decision To Discharge 11/09/2021 02:49:58 AM      Patient Referrals:  Samy Felipe  [address] [place] [id number] [phone]    In 2 days        Discharge Medications:  Discharge Medication List as of 11/9/2021  2:54 AM      START taking these medications    Details   methocarbamol (ROBAXIN-750) 750 MG tablet Take 1-2 tablets by mouth every 8 hours as needed (muscle cramps or pain), Disp-30 tablet, R-0Print             Discontinued Medications:  Discharge Medication List as of 11/9/2021  2:54 AM          This chart was generated using the [employer] dictation system. I created this record but it may contain dictation errors given the limitations of this technology.     Shantel Rosas DO (electronically signed)  Attending Emergency Physician       Shantel Rosas DO  11/09/21 [id number]